# Patient Record
Sex: FEMALE | Race: BLACK OR AFRICAN AMERICAN | Employment: FULL TIME | URBAN - NONMETROPOLITAN AREA
[De-identification: names, ages, dates, MRNs, and addresses within clinical notes are randomized per-mention and may not be internally consistent; named-entity substitution may affect disease eponyms.]

---

## 2024-08-16 ENCOUNTER — OFFICE VISIT (OUTPATIENT)
Dept: URGENT CARE | Facility: CLINIC | Age: 29
End: 2024-08-16
Payer: COMMERCIAL

## 2024-08-16 VITALS
HEART RATE: 101 BPM | OXYGEN SATURATION: 98 % | RESPIRATION RATE: 18 BRPM | TEMPERATURE: 97.2 F | DIASTOLIC BLOOD PRESSURE: 80 MMHG | SYSTOLIC BLOOD PRESSURE: 122 MMHG

## 2024-08-16 DIAGNOSIS — L03.011 PARONYCHIA OF FINGER OF RIGHT HAND: Primary | ICD-10-CM

## 2024-08-16 PROCEDURE — S9088 SERVICES PROVIDED IN URGENT: HCPCS | Performed by: PHYSICIAN ASSISTANT

## 2024-08-16 PROCEDURE — 10060 I&D ABSCESS SIMPLE/SINGLE: CPT | Performed by: PHYSICIAN ASSISTANT

## 2024-08-16 PROCEDURE — 99203 OFFICE O/P NEW LOW 30 MIN: CPT | Performed by: PHYSICIAN ASSISTANT

## 2024-08-16 RX ORDER — ASPIRIN 81 MG/1
162 TABLET ORAL DAILY
COMMUNITY
Start: 2024-07-08

## 2024-08-16 RX ORDER — INSULIN GLARGINE 100 [IU]/ML
INJECTION, SOLUTION SUBCUTANEOUS
COMMUNITY
Start: 2024-08-12

## 2024-08-16 RX ORDER — CEPHALEXIN 500 MG/1
500 CAPSULE ORAL EVERY 12 HOURS SCHEDULED
Qty: 14 CAPSULE | Refills: 0 | Status: SHIPPED | OUTPATIENT
Start: 2024-08-16 | End: 2024-08-23

## 2024-08-16 RX ORDER — INSULIN LISPRO 100 [IU]/ML
INJECTION, SOLUTION INTRAVENOUS; SUBCUTANEOUS
COMMUNITY
Start: 2024-08-12

## 2024-08-16 RX ORDER — METFORMIN HCL 500 MG
1000 TABLET, EXTENDED RELEASE 24 HR ORAL
COMMUNITY
Start: 2024-06-11

## 2024-08-16 NOTE — PROGRESS NOTES
[facility]        NAME: Ngozi Oquendo is a 29 y.o. female  : 1995    MRN: [id number]  DATE: 2024  TIME: 7:48 PM    Assessment and Plan   Paronychia of finger of right hand [L03.011]  1. Paronychia of finger of right hand  cephalexin (KEFLEX) 500 mg capsule            Patient Instructions   There are no Patient Instructions on file for this visit.      Follow up with PCP in 3-5 days.  Proceed to  ER if symptoms worsen.    Chief Complaint     Chief Complaint   Patient presents with    Nail Problem     Right hand 4th digit edema around nail bed.  It is hard and red.  Pain traveling up arm.  Started few days ago.  Did have pus coming out from side of nail yesterday.  Patient is 5.5 months pregnant.         History of Present Illness       The patient is a 29-year-old female who presents to the clinic for swelling of the distal aspect of the right fourth digit.  She states that she does have limited range of motion but denies fevers or chills.  She is currently 24 weeks pregnant.  She is complaining of associated numbness in her right arm.  He denies any streaking in her arm.        Review of Systems   Review of Systems   Musculoskeletal:  Positive for arthralgias. Negative for myalgias and neck stiffness.   Skin:  Positive for wound.   Neurological:  Positive for numbness.         Current Medications       Current Outpatient Medications:     aspirin (ECOTRIN LOW STRENGTH) 81 mg EC tablet, Take 162 mg by mouth daily, Disp: , Rfl:     cephalexin (KEFLEX) 500 mg capsule, Take 1 capsule (500 mg total) by mouth every 12 (twelve) hours for 7 days, Disp: 14 capsule, Rfl: 0    Ferrous Sulfate (IRON PO), Take 1 tablet by mouth daily, Disp: , Rfl:     Insulin Glargine Solostar (Lantus SoloStar) 100 UNIT/ML SOPN, Inject 32 U in the AM and 42 U at HS. Titrate up to  units, Disp: , Rfl:     insulin lispro (HumaLOG) 100 units/mL injection pen, 32  units  20 minutes before each meal, Disp: , Rfl:      metFORMIN (GLUCOPHAGE-XR) 500 mg 24 hr tablet, Take 1,000 mg by mouth, Disp: , Rfl:     Current Allergies     Allergies as of 08/16/2024 - Reviewed 08/16/2024   Allergen Reaction Noted    Latex Rash 04/23/2024            The following portions of the patient's history were reviewed and updated as appropriate: allergies, current medications, past family history, past medical history, past social history, past surgical history and problem list.     Past Medical History:   Diagnosis Date    Diabetes mellitus (HCC)        History reviewed. No pertinent surgical history.    No family history on file.      Medications have been verified.        Objective   /80 (BP Location: Left arm)   Pulse 101   Temp (!) 97.2 °F (36.2 °C) (Skin)   Resp 18   SpO2 98%        Physical Exam     Physical Exam  Musculoskeletal:        Hands:       Comments: -There is edema noted at the distal aspect of the right fourth digit.           Incision and drain    Date/Time: 8/16/2024 1:00 PM    Performed by: Gucci Bustos PA-C  Authorized by: Gucci Bustos PA-C  Universal Protocol:  Consent: Verbal consent obtained.  Risks and benefits: risks, benefits and alternatives were discussed  Consent given by: patient  Patient identity confirmed: verbally with patient    Patient location:  Clinic  Location:     Type:  Fluid collection    Location:  Upper extremity    Upper extremity location:  R ring finger  Pre-procedure details:     Skin preparation:  Antiseptic wash and Betadine  Anesthesia (see MAR for exact dosages):     Anesthesia method:  Topical application  Procedure details:     Complexity:  Simple    Incision types:  Stab incision    Scalpel blade:  11    Approach:  Open    Drainage:  Bloody    Drainage amount:  Scant  Post-procedure details:     Patient tolerance of procedure:  Tolerated well, no immediate complications  Comments:      -There was no injection with lidocaine for this procedure.        -Patient was treated with  Keflex.  I suggest follow-up with PCP or go to the ER if symptoms worsen